# Patient Record
Sex: FEMALE | Race: OTHER | HISPANIC OR LATINO | Employment: UNEMPLOYED | URBAN - METROPOLITAN AREA
[De-identification: names, ages, dates, MRNs, and addresses within clinical notes are randomized per-mention and may not be internally consistent; named-entity substitution may affect disease eponyms.]

---

## 2019-10-10 LAB
LEFT EYE DM RETINOPATHY: POSITIVE
RIGHT EYE DM RETINOPATHY: POSITIVE

## 2019-12-18 PROBLEM — E11.9 DIABETES MELLITUS: Status: ACTIVE | Noted: 2018-11-21

## 2019-12-18 PROBLEM — E78.5 DYSLIPIDEMIA: Status: ACTIVE | Noted: 2018-11-21

## 2019-12-18 PROBLEM — E55.9 VITAMIN D DEFICIENCY: Status: ACTIVE | Noted: 2019-04-10

## 2019-12-18 PROBLEM — G62.9 PERIPHERAL NEUROPATHY: Status: ACTIVE | Noted: 2018-11-21

## 2019-12-18 PROBLEM — I10 BENIGN ESSENTIAL HTN: Status: ACTIVE | Noted: 2019-12-18

## 2020-02-26 PROBLEM — H25.813 COMBINED FORMS OF AGE-RELATED CATARACT OF BOTH EYES: Status: ACTIVE | Noted: 2019-10-10

## 2020-02-26 PROBLEM — I20.0 UNSTABLE ANGINA PECTORIS: Status: RESOLVED | Noted: 2020-02-26 | Resolved: 2020-02-26

## 2020-02-26 PROBLEM — H81.13 BENIGN PAROXYSMAL POSITIONAL VERTIGO DUE TO BILATERAL VESTIBULAR DISORDER: Status: ACTIVE | Noted: 2019-08-21

## 2020-02-26 PROBLEM — I10 HYPERTENSION: Status: ACTIVE | Noted: 2018-11-21

## 2020-02-26 PROBLEM — I20.0 UNSTABLE ANGINA PECTORIS: Status: ACTIVE | Noted: 2020-02-26

## 2020-02-26 PROBLEM — H35.033 HYPERTENSIVE RETINOPATHY OF BOTH EYES: Status: ACTIVE | Noted: 2019-10-10

## 2020-02-26 PROBLEM — R07.89 COSTOCHONDRAL CHEST PAIN: Status: ACTIVE | Noted: 2020-02-26

## 2020-02-26 PROBLEM — E11.3293 NONPROLIFERATIVE DIABETIC RETINOPATHY OF BOTH EYES WITHOUT MACULAR EDEMA: Status: ACTIVE | Noted: 2019-10-10

## 2020-10-22 PROBLEM — R10.84 GENERALIZED ABDOMINAL PAIN: Status: ACTIVE | Noted: 2020-10-22

## 2021-02-11 PROBLEM — Z01.419 WOMEN'S ANNUAL ROUTINE GYNECOLOGICAL EXAMINATION: Status: ACTIVE | Noted: 2021-02-11

## 2021-03-12 LAB
LEFT EYE DM RETINOPATHY: POSITIVE
RIGHT EYE DM RETINOPATHY: POSITIVE

## 2021-03-18 PROBLEM — N85.2 ENLARGED UTERUS: Status: ACTIVE | Noted: 2021-03-18

## 2021-08-12 ENCOUNTER — PATIENT OUTREACH (OUTPATIENT)
Dept: ADMINISTRATIVE | Facility: HOSPITAL | Age: 56
End: 2021-08-12

## 2021-10-20 PROBLEM — R14.0 ABDOMINAL BLOATING: Status: ACTIVE | Noted: 2021-10-20

## 2021-10-20 LAB — CRC RECOMMENDATION EXT: NORMAL

## 2022-02-14 PROBLEM — Z01.419 ENCOUNTER FOR WELL WOMAN EXAM WITH ROUTINE GYNECOLOGICAL EXAM: Status: ACTIVE | Noted: 2022-02-14

## 2022-03-07 PROBLEM — H25.813 COMBINED FORM OF AGE-RELATED CATARACT, BOTH EYES: Status: ACTIVE | Noted: 2022-03-07

## 2022-03-07 PROBLEM — H04.123 DRY EYE SYNDROME OF BOTH EYES: Status: ACTIVE | Noted: 2022-03-07

## 2022-03-07 PROBLEM — H25.812 COMBINED FORM OF AGE-RELATED CATARACT, LEFT EYE: Status: ACTIVE | Noted: 2022-03-07

## 2022-03-07 PROBLEM — H52.4 PRESBYOPIA OF BOTH EYES: Status: ACTIVE | Noted: 2022-03-07

## 2022-03-29 ENCOUNTER — PATIENT OUTREACH (OUTPATIENT)
Dept: ADMINISTRATIVE | Facility: HOSPITAL | Age: 57
End: 2022-03-29

## 2022-03-29 PROBLEM — G89.29 CHRONIC PAIN OF BOTH KNEES: Status: ACTIVE | Noted: 2022-03-29

## 2022-03-29 PROBLEM — Z98.890 HISTORY OF COLONOSCOPY: Status: ACTIVE | Noted: 2022-03-29

## 2022-03-29 PROBLEM — M25.561 CHRONIC PAIN OF BOTH KNEES: Status: ACTIVE | Noted: 2022-03-29

## 2022-03-29 PROBLEM — M25.562 CHRONIC PAIN OF BOTH KNEES: Status: ACTIVE | Noted: 2022-03-29

## 2022-03-31 PROBLEM — R87.615 UNSATISFACTORY CERVICAL PAPANICOLAOU SMEAR: Status: ACTIVE | Noted: 2022-03-31

## 2022-08-08 PROBLEM — R87.615 ENCOUNTER FOR REPEAT PAPANICOLAOU SMEAR OF CERVIX DUE TO PREVIOUS UNSATISFACTORY RESULTS: Status: ACTIVE | Noted: 2022-08-08

## 2024-10-22 ENCOUNTER — PATIENT OUTREACH (OUTPATIENT)
Dept: ADMINISTRATIVE | Facility: OTHER | Age: 59
End: 2024-10-22

## 2024-10-22 NOTE — PROGRESS NOTES
CHW - Initial Contact    This Community Health Worker completed the Social Determinant of Health questionnaire with patient during clinic visit today.    Pt identified barriers of most importance are: patient identified no barriers of importance during clinic visit    Referrals to community agencies completed with patient consent outside of [facility] include: No community referral needed   During clinic visit   Referrals were put through [facility] - no:   Support and Services: No support services needed during clinic visit   Other information discussed the patient needs help with: patient discussed no other information during clinic visit   Follow up required: yes  Follow-up Outreach - Due: 11/5/2024

## 2025-01-10 ENCOUNTER — PATIENT OUTREACH (OUTPATIENT)
Dept: ADMINISTRATIVE | Facility: OTHER | Age: 60
End: 2025-01-10

## 2025-01-10 NOTE — PROGRESS NOTES
CHW - Follow Up    This Community Health Worker completed a follow up visit with patient via telephone today.  Pt reported: patient reported she is doing good no assistance is needed during follow up phone interview.   Patient will reach out if assistance is needed in the future.   Community Health Worker provided: patient reported she is doing good  Follow up required: No  Case Closure - Due: 1/10/2025             CHW - Case Closure    This Community Health Worker spoke to patient via telephone today.   Pt reported: patient reported she is doing good no assistance is needed during follow up phone interview.   Patient will reach out if assistance is needed in the future.   Pt denied any additional needs at this time and agrees with episode closure at this time.    Provided patient with Community Health Worker's contact information and encouraged   her to contact this Community Health Worker if additional needs arise.